# Patient Record
Sex: MALE | Race: BLACK OR AFRICAN AMERICAN | Employment: UNEMPLOYED | URBAN - METROPOLITAN AREA
[De-identification: names, ages, dates, MRNs, and addresses within clinical notes are randomized per-mention and may not be internally consistent; named-entity substitution may affect disease eponyms.]

---

## 2017-06-16 ENCOUNTER — HOSPITAL ENCOUNTER (EMERGENCY)
Facility: HOSPITAL | Age: 57
Discharge: HOME | End: 2017-06-16
Payer: MEDICAID

## 2017-06-16 VITALS
RESPIRATION RATE: 20 BRPM | OXYGEN SATURATION: 100 % | BODY MASS INDEX: 23.1 KG/M2 | SYSTOLIC BLOOD PRESSURE: 137 MMHG | DIASTOLIC BLOOD PRESSURE: 83 MMHG | HEART RATE: 100 BPM | WEIGHT: 180 LBS | TEMPERATURE: 97 F | HEIGHT: 74 IN

## 2017-06-16 DIAGNOSIS — M79.673 CHRONIC FOOT PAIN, UNSPECIFIED LATERALITY: Primary | ICD-10-CM

## 2017-06-16 DIAGNOSIS — G89.29 CHRONIC FOOT PAIN, UNSPECIFIED LATERALITY: Primary | ICD-10-CM

## 2017-06-16 PROCEDURE — 99281 EMR DPT VST MAYX REQ PHY/QHP: CPT

## 2017-06-16 NOTE — ED NOTES
Pt ambulating in hallway without any apparent difficulty. States, \"I have a ride coming to pick me up, so I'm just going to leave now. \"  Given some referrals by .
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
## 2017-06-16 NOTE — ED PROVIDER NOTES
Patient Seen in: [facility] Emergency Department    History   Patient presents with:  Musculoskeletal Problem    Stated Complaint: Foot Pain    HPI    49-year-old male presents to the emergency department complaining of bilateral foot pain for yea display    MDM    to Bedside for referral options        Disposition and Plan     Clinical Impression:  Chronic foot pain, unspecified laterality  (primary encounter diagnosis)    Disposition:  Discharge    Follow-up:  Kaden Obrien DPM  [address]

## 2018-05-25 ENCOUNTER — APPOINTMENT (OUTPATIENT)
Dept: GENERAL RADIOLOGY | Facility: HOSPITAL | Age: 58
End: 2018-05-25
Payer: MEDICAID

## 2018-05-25 ENCOUNTER — APPOINTMENT (OUTPATIENT)
Dept: GENERAL RADIOLOGY | Facility: HOSPITAL | Age: 58
End: 2018-05-25
Attending: EMERGENCY MEDICINE
Payer: MEDICAID

## 2018-05-25 ENCOUNTER — HOSPITAL ENCOUNTER (EMERGENCY)
Facility: HOSPITAL | Age: 58
Discharge: ANOTHER HOSPITAL | End: 2018-05-26
Attending: EMERGENCY MEDICINE
Payer: MEDICAID

## 2018-05-25 VITALS
RESPIRATION RATE: 12 BRPM | WEIGHT: 180 LBS | DIASTOLIC BLOOD PRESSURE: 77 MMHG | BODY MASS INDEX: 23.86 KG/M2 | HEIGHT: 73 IN | OXYGEN SATURATION: 95 % | HEART RATE: 83 BPM | SYSTOLIC BLOOD PRESSURE: 121 MMHG | TEMPERATURE: 99 F

## 2018-05-25 DIAGNOSIS — R07.9 CHEST PAIN OF UNCERTAIN ETIOLOGY: Primary | ICD-10-CM

## 2018-05-25 PROCEDURE — 71045 X-RAY EXAM CHEST 1 VIEW: CPT | Performed by: EMERGENCY MEDICINE

## 2018-05-25 PROCEDURE — 93010 ELECTROCARDIOGRAM REPORT: CPT | Performed by: EMERGENCY MEDICINE

## 2018-05-25 PROCEDURE — 36415 COLL VENOUS BLD VENIPUNCTURE: CPT

## 2018-05-25 PROCEDURE — 85379 FIBRIN DEGRADATION QUANT: CPT | Performed by: EMERGENCY MEDICINE

## 2018-05-25 PROCEDURE — 99285 EMERGENCY DEPT VISIT HI MDM: CPT

## 2018-05-25 PROCEDURE — 93005 ELECTROCARDIOGRAM TRACING: CPT

## 2018-05-25 PROCEDURE — 84484 ASSAY OF TROPONIN QUANT: CPT | Performed by: EMERGENCY MEDICINE

## 2018-05-25 PROCEDURE — 85025 COMPLETE CBC W/AUTO DIFF WBC: CPT | Performed by: EMERGENCY MEDICINE

## 2018-05-25 PROCEDURE — 80048 BASIC METABOLIC PNL TOTAL CA: CPT | Performed by: EMERGENCY MEDICINE

## 2018-05-25 RX ORDER — ASPIRIN 325 MG
325 TABLET ORAL ONCE
Status: COMPLETED | OUTPATIENT
Start: 2018-05-25 | End: 2018-05-25

## 2018-05-26 NOTE — CM/SW NOTE
Dr. Valentino Monday accepted patient at [facility]. Spoke w/ Yessi Judge @ [facility] - pt is going to [address], number for RN to RN report is [phone]. [facility]. Pt notified of the above.   RN notifi

## 2018-05-26 NOTE — ED NOTES
Patient accompanied by family for c/o sternal CP that is worse with movement and began yesterday. Patient admits he is a smoker and denies any accompanying symptoms. Patient is a/o and does not appear to be in distress- acting appropriate for age.

## 2018-05-26 NOTE — CM/SW NOTE
[facility] has no beds. Called [facility] and faxed facesheet - per [facility] they have limited beds so unlikely they will be able to accept patient. [facility] while waiting to hear back from [facility].

## 2018-05-26 NOTE — ED PROVIDER NOTES
Patient Seen in: [facility] Emergency Department    History   Patient presents with:  Chest Pain Angina (cardiovascular)    Stated Complaint: Chest pain    HPI    59year old male presenting with chest pain  Location: retrosternal area.   Quality: sh distress. HENT:   Head: Normocephalic and atraumatic. Head is without raccoon's eyes, without right periorbital erythema and without left periorbital erythema.    Nose: Nose normal.   Mouth/Throat: Mucous membranes are normal. Mucous membranes are not pal -----------         ------                     CBC W/ DIFFERENTIAL[[id number]]          Abnormal            Final result                 Please view results for these tests on the individual orders.    RAINBOW DRAW BLUE   RAINBOW DRAW LAVENDER   RAINBOW diagnosis to include Acute coronary syndrome; Acute pulmonary process including pneumothorax, Dissection, pleurisy, pleural effusion, PE, Pneumonia; GI related pathology such as GERD, gastritis, esophagitis, PUD, esophageal spasm; Musculoskeletal    HEART Consult to Cardiology Once    MD Discussions or Sign-Outs: monique sebastian    Impression:  After consideration of the above differential and review and interpretation of the above emergency department workup along with clinical intuition, the patient

## 2018-05-26 NOTE — CM/SW NOTE
[address] spoke with patient and he would prefer to be transferred to either [facility] ([facility]) or [facility]. Pt is stable for transfer and too many risk factors to be discharged per Dr. Srini Montgomery.   Called [facility] and spoke w/ Jerri Dye re: any telemet

## 2018-05-26 NOTE — CM/SW NOTE
Spoke w/ Chief Medical Resident at [facility] ([facility]) and they do not have beds either in their ER or inpatient unit to accept patient. [facility] If [facility] will admit patient here under observation status.

## 2018-05-26 NOTE — CM/SW NOTE
Updated Dr. Vanessa Noe, pt and RN no beds at [facility], awaiting to hear back from [facility] and will also call [facility] ([facility]) while awaiting call back from [facility]. All verbalized understanding.

## 2018-05-26 NOTE — CM/SW NOTE
[facility] Dr[facility] Merlin [facility] - transferred call to Dr. Merlin Pipes.